# Patient Record
Sex: FEMALE | Race: WHITE | NOT HISPANIC OR LATINO | URBAN - METROPOLITAN AREA
[De-identification: names, ages, dates, MRNs, and addresses within clinical notes are randomized per-mention and may not be internally consistent; named-entity substitution may affect disease eponyms.]

---

## 2017-05-15 ENCOUNTER — OUTPATIENT (OUTPATIENT)
Dept: OUTPATIENT SERVICES | Facility: HOSPITAL | Age: 37
LOS: 1 days | Discharge: HOME | End: 2017-05-15

## 2017-06-28 DIAGNOSIS — Z00.00 ENCOUNTER FOR GENERAL ADULT MEDICAL EXAMINATION WITHOUT ABNORMAL FINDINGS: ICD-10-CM

## 2017-07-17 ENCOUNTER — OUTPATIENT (OUTPATIENT)
Dept: OUTPATIENT SERVICES | Facility: HOSPITAL | Age: 37
LOS: 1 days | Discharge: HOME | End: 2017-07-17

## 2017-07-17 DIAGNOSIS — N91.2 AMENORRHEA, UNSPECIFIED: ICD-10-CM

## 2017-07-25 ENCOUNTER — OUTPATIENT (OUTPATIENT)
Dept: OUTPATIENT SERVICES | Facility: HOSPITAL | Age: 37
LOS: 1 days | Discharge: HOME | End: 2017-07-25

## 2017-07-25 DIAGNOSIS — N91.1 SECONDARY AMENORRHEA: ICD-10-CM

## 2017-08-03 ENCOUNTER — OUTPATIENT (OUTPATIENT)
Dept: OUTPATIENT SERVICES | Facility: HOSPITAL | Age: 37
LOS: 1 days | Discharge: HOME | End: 2017-08-03

## 2017-08-03 DIAGNOSIS — N91.1 SECONDARY AMENORRHEA: ICD-10-CM

## 2017-08-22 ENCOUNTER — OUTPATIENT (OUTPATIENT)
Dept: OUTPATIENT SERVICES | Facility: HOSPITAL | Age: 37
LOS: 1 days | Discharge: HOME | End: 2017-08-22

## 2017-08-22 DIAGNOSIS — Z34.00 ENCOUNTER FOR SUPERVISION OF NORMAL FIRST PREGNANCY, UNSPECIFIED TRIMESTER: ICD-10-CM

## 2017-09-18 ENCOUNTER — OUTPATIENT (OUTPATIENT)
Dept: OUTPATIENT SERVICES | Facility: HOSPITAL | Age: 37
LOS: 1 days | Discharge: HOME | End: 2017-09-18

## 2017-09-18 DIAGNOSIS — Z02.9 ENCOUNTER FOR ADMINISTRATIVE EXAMINATIONS, UNSPECIFIED: ICD-10-CM

## 2017-11-30 ENCOUNTER — OUTPATIENT (OUTPATIENT)
Dept: OUTPATIENT SERVICES | Facility: HOSPITAL | Age: 37
LOS: 1 days | Discharge: HOME | End: 2017-11-30

## 2017-11-30 DIAGNOSIS — Z33.1 PREGNANT STATE, INCIDENTAL: ICD-10-CM

## 2017-12-02 ENCOUNTER — OUTPATIENT (OUTPATIENT)
Dept: OUTPATIENT SERVICES | Facility: HOSPITAL | Age: 37
LOS: 1 days | Discharge: HOME | End: 2017-12-02

## 2017-12-05 ENCOUNTER — OUTPATIENT (OUTPATIENT)
Dept: OUTPATIENT SERVICES | Facility: HOSPITAL | Age: 37
LOS: 1 days | Discharge: HOME | End: 2017-12-05

## 2017-12-05 DIAGNOSIS — Z34.00 ENCOUNTER FOR SUPERVISION OF NORMAL FIRST PREGNANCY, UNSPECIFIED TRIMESTER: ICD-10-CM

## 2018-01-04 PROBLEM — Z00.00 ENCOUNTER FOR PREVENTIVE HEALTH EXAMINATION: Status: ACTIVE | Noted: 2018-01-04

## 2018-01-05 ENCOUNTER — OUTPATIENT (OUTPATIENT)
Dept: OUTPATIENT SERVICES | Facility: HOSPITAL | Age: 38
LOS: 1 days | Discharge: HOME | End: 2018-01-05

## 2018-01-09 DIAGNOSIS — O02.1 MISSED ABORTION: ICD-10-CM

## 2018-01-09 DIAGNOSIS — N85.4 MALPOSITION OF UTERUS: ICD-10-CM

## 2018-01-09 DIAGNOSIS — Z3A.08 8 WEEKS GESTATION OF PREGNANCY: ICD-10-CM

## 2018-01-25 ENCOUNTER — APPOINTMENT (OUTPATIENT)
Dept: OBGYN | Facility: CLINIC | Age: 38
End: 2018-01-25

## 2018-03-19 ENCOUNTER — OUTPATIENT (OUTPATIENT)
Dept: OUTPATIENT SERVICES | Facility: HOSPITAL | Age: 38
LOS: 1 days | Discharge: HOME | End: 2018-03-19

## 2018-03-19 DIAGNOSIS — N92.5 OTHER SPECIFIED IRREGULAR MENSTRUATION: ICD-10-CM

## 2018-04-11 ENCOUNTER — RESULT REVIEW (OUTPATIENT)
Age: 38
End: 2018-04-11

## 2018-04-11 ENCOUNTER — OUTPATIENT (OUTPATIENT)
Dept: OUTPATIENT SERVICES | Facility: HOSPITAL | Age: 38
LOS: 1 days | Discharge: HOME | End: 2018-04-11

## 2018-04-11 VITALS
RESPIRATION RATE: 21 BRPM | HEART RATE: 57 BPM | SYSTOLIC BLOOD PRESSURE: 103 MMHG | DIASTOLIC BLOOD PRESSURE: 58 MMHG | OXYGEN SATURATION: 100 %

## 2018-04-11 VITALS
OXYGEN SATURATION: 97 % | TEMPERATURE: 99 F | HEART RATE: 64 BPM | SYSTOLIC BLOOD PRESSURE: 101 MMHG | WEIGHT: 195.11 LBS | RESPIRATION RATE: 16 BRPM | DIASTOLIC BLOOD PRESSURE: 56 MMHG

## 2018-04-11 DIAGNOSIS — O02.1 MISSED ABORTION: ICD-10-CM

## 2018-04-11 DIAGNOSIS — Z98.890 OTHER SPECIFIED POSTPROCEDURAL STATES: Chronic | ICD-10-CM

## 2018-04-11 DIAGNOSIS — Z3A.08 8 WEEKS GESTATION OF PREGNANCY: ICD-10-CM

## 2018-04-11 RX ORDER — OXYCODONE AND ACETAMINOPHEN 5; 325 MG/1; MG/1
2 TABLET ORAL EVERY 6 HOURS
Qty: 0 | Refills: 0 | Status: DISCONTINUED | OUTPATIENT
Start: 2018-04-11 | End: 2018-04-11

## 2018-04-11 RX ORDER — MORPHINE SULFATE 50 MG/1
2 CAPSULE, EXTENDED RELEASE ORAL
Qty: 0 | Refills: 0 | Status: DISCONTINUED | OUTPATIENT
Start: 2018-04-11 | End: 2018-04-11

## 2018-04-11 RX ORDER — ONDANSETRON 8 MG/1
4 TABLET, FILM COATED ORAL ONCE
Qty: 0 | Refills: 0 | Status: DISCONTINUED | OUTPATIENT
Start: 2018-04-11 | End: 2018-04-26

## 2018-04-11 RX ORDER — MORPHINE SULFATE 50 MG/1
4 CAPSULE, EXTENDED RELEASE ORAL
Qty: 0 | Refills: 0 | Status: DISCONTINUED | OUTPATIENT
Start: 2018-04-11 | End: 2018-04-11

## 2018-04-11 RX ORDER — SODIUM CHLORIDE 9 MG/ML
1000 INJECTION, SOLUTION INTRAVENOUS
Qty: 0 | Refills: 0 | Status: DISCONTINUED | OUTPATIENT
Start: 2018-04-11 | End: 2018-04-26

## 2018-04-11 RX ADMIN — SODIUM CHLORIDE 100 MILLILITER(S): 9 INJECTION, SOLUTION INTRAVENOUS at 13:03

## 2018-04-11 NOTE — ASU PREOP CHECKLIST - PATIENT PROBLEMS/NEEDS
Patient expressed no known problems or needs pt is a positive trh/Patient expressed no known problems or needs

## 2018-04-11 NOTE — BRIEF OPERATIVE NOTE - PROCEDURE
<<-----Click on this checkbox to enter Procedure Dilation and curettage  04/11/2018    Active  [id number]

## 2018-04-11 NOTE — H&P PST ADULT - HISTORY OF PRESENT ILLNESS
38 you G 5 P 0040 LMP: 2/12/18 with blighted ovum/ missed ab on sonogram 4/4/18 - pt should have been 8 wks gestation - sono - no fhr, no pole, + gest sac

## 2018-04-12 LAB — SURGICAL PATHOLOGY STUDY: SIGNIFICANT CHANGE UP

## 2018-05-10 LAB — CHROM ANALY OVERALL INTERP SPEC-IMP: SIGNIFICANT CHANGE UP

## 2018-06-01 ENCOUNTER — OUTPATIENT (OUTPATIENT)
Dept: OUTPATIENT SERVICES | Facility: HOSPITAL | Age: 38
LOS: 1 days | Discharge: HOME | End: 2018-06-01

## 2018-06-01 DIAGNOSIS — Z98.890 OTHER SPECIFIED POSTPROCEDURAL STATES: Chronic | ICD-10-CM

## 2018-06-01 DIAGNOSIS — N96 RECURRENT PREGNANCY LOSS: ICD-10-CM

## 2018-07-25 ENCOUNTER — OUTPATIENT (OUTPATIENT)
Dept: OUTPATIENT SERVICES | Facility: HOSPITAL | Age: 38
LOS: 1 days | Discharge: HOME | End: 2018-07-25

## 2018-07-25 DIAGNOSIS — D47.1 CHRONIC MYELOPROLIFERATIVE DISEASE: ICD-10-CM

## 2018-07-25 DIAGNOSIS — Z98.890 OTHER SPECIFIED POSTPROCEDURAL STATES: Chronic | ICD-10-CM

## 2018-07-25 DIAGNOSIS — D51.1 VITAMIN B12 DEFICIENCY ANEMIA DUE TO SELECTIVE VITAMIN B12 MALABSORPTION WITH PROTEINURIA: ICD-10-CM

## 2018-07-25 DIAGNOSIS — D59.9 ACQUIRED HEMOLYTIC ANEMIA, UNSPECIFIED: ICD-10-CM

## 2018-07-25 DIAGNOSIS — M06.9 RHEUMATOID ARTHRITIS, UNSPECIFIED: ICD-10-CM

## 2018-08-22 ENCOUNTER — OUTPATIENT (OUTPATIENT)
Dept: OUTPATIENT SERVICES | Facility: HOSPITAL | Age: 38
LOS: 1 days | Discharge: HOME | End: 2018-08-22

## 2018-08-22 DIAGNOSIS — Z91.89 OTHER SPECIFIED PERSONAL RISK FACTORS, NOT ELSEWHERE CLASSIFIED: ICD-10-CM

## 2018-08-22 DIAGNOSIS — Z98.890 OTHER SPECIFIED POSTPROCEDURAL STATES: Chronic | ICD-10-CM

## 2018-08-22 DIAGNOSIS — D68.9 COAGULATION DEFECT, UNSPECIFIED: ICD-10-CM

## 2018-08-22 DIAGNOSIS — M06.9 RHEUMATOID ARTHRITIS, UNSPECIFIED: ICD-10-CM

## 2018-09-12 ENCOUNTER — TRANSCRIPTION ENCOUNTER (OUTPATIENT)
Age: 38
End: 2018-09-12

## 2018-10-06 ENCOUNTER — TRANSCRIPTION ENCOUNTER (OUTPATIENT)
Age: 38
End: 2018-10-06

## 2019-04-11 ENCOUNTER — OUTPATIENT (OUTPATIENT)
Dept: OUTPATIENT SERVICES | Facility: HOSPITAL | Age: 39
LOS: 1 days | Discharge: HOME | End: 2019-04-11

## 2019-04-11 DIAGNOSIS — Z34.90 ENCOUNTER FOR SUPERVISION OF NORMAL PREGNANCY, UNSPECIFIED, UNSPECIFIED TRIMESTER: ICD-10-CM

## 2019-04-11 DIAGNOSIS — Z98.890 OTHER SPECIFIED POSTPROCEDURAL STATES: Chronic | ICD-10-CM

## 2019-04-13 ENCOUNTER — OUTPATIENT (OUTPATIENT)
Dept: OUTPATIENT SERVICES | Facility: HOSPITAL | Age: 39
LOS: 1 days | Discharge: HOME | End: 2019-04-13

## 2019-04-13 DIAGNOSIS — Z98.890 OTHER SPECIFIED POSTPROCEDURAL STATES: Chronic | ICD-10-CM

## 2019-04-13 DIAGNOSIS — Z34.90 ENCOUNTER FOR SUPERVISION OF NORMAL PREGNANCY, UNSPECIFIED, UNSPECIFIED TRIMESTER: ICD-10-CM

## 2019-04-17 ENCOUNTER — OUTPATIENT (OUTPATIENT)
Dept: OUTPATIENT SERVICES | Facility: HOSPITAL | Age: 39
LOS: 1 days | Discharge: HOME | End: 2019-04-17

## 2019-04-17 DIAGNOSIS — N92.5 OTHER SPECIFIED IRREGULAR MENSTRUATION: ICD-10-CM

## 2019-04-17 DIAGNOSIS — Z98.890 OTHER SPECIFIED POSTPROCEDURAL STATES: Chronic | ICD-10-CM

## 2019-04-18 ENCOUNTER — OUTPATIENT (OUTPATIENT)
Dept: OUTPATIENT SERVICES | Facility: HOSPITAL | Age: 39
LOS: 1 days | Discharge: HOME | End: 2019-04-18

## 2019-04-18 DIAGNOSIS — Z98.890 OTHER SPECIFIED POSTPROCEDURAL STATES: Chronic | ICD-10-CM

## 2019-04-18 DIAGNOSIS — N92.5 OTHER SPECIFIED IRREGULAR MENSTRUATION: ICD-10-CM

## 2019-04-26 ENCOUNTER — OUTPATIENT (OUTPATIENT)
Dept: OUTPATIENT SERVICES | Facility: HOSPITAL | Age: 39
LOS: 1 days | Discharge: HOME | End: 2019-04-26

## 2019-04-26 DIAGNOSIS — Z98.890 OTHER SPECIFIED POSTPROCEDURAL STATES: Chronic | ICD-10-CM

## 2019-04-26 DIAGNOSIS — Z34.00 ENCOUNTER FOR SUPERVISION OF NORMAL FIRST PREGNANCY, UNSPECIFIED TRIMESTER: ICD-10-CM

## 2019-05-16 ENCOUNTER — OUTPATIENT (OUTPATIENT)
Dept: OUTPATIENT SERVICES | Facility: HOSPITAL | Age: 39
LOS: 1 days | Discharge: HOME | End: 2019-05-16

## 2019-05-16 DIAGNOSIS — Z98.890 OTHER SPECIFIED POSTPROCEDURAL STATES: Chronic | ICD-10-CM

## 2019-05-16 DIAGNOSIS — Z34.00 ENCOUNTER FOR SUPERVISION OF NORMAL FIRST PREGNANCY, UNSPECIFIED TRIMESTER: ICD-10-CM

## 2019-09-04 ENCOUNTER — OUTPATIENT (OUTPATIENT)
Dept: OUTPATIENT SERVICES | Facility: HOSPITAL | Age: 39
LOS: 1 days | Discharge: HOME | End: 2019-09-04

## 2019-09-04 DIAGNOSIS — Z34.00 ENCOUNTER FOR SUPERVISION OF NORMAL FIRST PREGNANCY, UNSPECIFIED TRIMESTER: ICD-10-CM

## 2019-09-04 DIAGNOSIS — Z98.890 OTHER SPECIFIED POSTPROCEDURAL STATES: Chronic | ICD-10-CM

## 2019-10-11 ENCOUNTER — OUTPATIENT (OUTPATIENT)
Dept: OUTPATIENT SERVICES | Facility: HOSPITAL | Age: 39
LOS: 1 days | Discharge: HOME | End: 2019-10-11

## 2019-10-11 DIAGNOSIS — Z34.00 ENCOUNTER FOR SUPERVISION OF NORMAL FIRST PREGNANCY, UNSPECIFIED TRIMESTER: ICD-10-CM

## 2019-10-11 DIAGNOSIS — Z98.890 OTHER SPECIFIED POSTPROCEDURAL STATES: Chronic | ICD-10-CM

## 2019-11-19 ENCOUNTER — OUTPATIENT (OUTPATIENT)
Dept: OUTPATIENT SERVICES | Facility: HOSPITAL | Age: 39
LOS: 1 days | Discharge: HOME | End: 2019-11-19

## 2019-11-19 DIAGNOSIS — Z98.890 OTHER SPECIFIED POSTPROCEDURAL STATES: Chronic | ICD-10-CM

## 2019-11-20 DIAGNOSIS — N89.8 OTHER SPECIFIED NONINFLAMMATORY DISORDERS OF VAGINA: ICD-10-CM

## 2019-12-12 ENCOUNTER — INPATIENT (INPATIENT)
Facility: HOSPITAL | Age: 39
LOS: 4 days | Discharge: HOME | End: 2019-12-17
Attending: OBSTETRICS & GYNECOLOGY | Admitting: OBSTETRICS & GYNECOLOGY

## 2019-12-12 VITALS — DIASTOLIC BLOOD PRESSURE: 72 MMHG | SYSTOLIC BLOOD PRESSURE: 131 MMHG | HEART RATE: 109 BPM

## 2019-12-12 DIAGNOSIS — Z98.890 OTHER SPECIFIED POSTPROCEDURAL STATES: Chronic | ICD-10-CM

## 2019-12-12 LAB
APPEARANCE UR: CLEAR — SIGNIFICANT CHANGE UP
APTT BLD: 22.9 SEC — CRITICAL LOW (ref 27–39.2)
BASOPHILS # BLD AUTO: 0.04 K/UL — SIGNIFICANT CHANGE UP (ref 0–0.2)
BASOPHILS NFR BLD AUTO: 0.3 % — SIGNIFICANT CHANGE UP (ref 0–1)
BILIRUB UR-MCNC: NEGATIVE — SIGNIFICANT CHANGE UP
BLD GP AB SCN SERPL QL: SIGNIFICANT CHANGE UP
COLOR SPEC: SIGNIFICANT CHANGE UP
DIFF PNL FLD: NEGATIVE — SIGNIFICANT CHANGE UP
EOSINOPHIL # BLD AUTO: 0.09 K/UL — SIGNIFICANT CHANGE UP (ref 0–0.7)
EOSINOPHIL NFR BLD AUTO: 0.7 % — SIGNIFICANT CHANGE UP (ref 0–8)
FIBRINOGEN PPP-MCNC: >700 MG/DL — HIGH (ref 204.4–570.6)
GLUCOSE UR QL: NEGATIVE — SIGNIFICANT CHANGE UP
HCT VFR BLD CALC: 35.9 % — LOW (ref 37–47)
HGB BLD-MCNC: 12.2 G/DL — SIGNIFICANT CHANGE UP (ref 12–16)
IMM GRANULOCYTES NFR BLD AUTO: 0.7 % — HIGH (ref 0.1–0.3)
INR BLD: 0.88 RATIO — SIGNIFICANT CHANGE UP (ref 0.65–1.3)
KETONES UR-MCNC: NEGATIVE — SIGNIFICANT CHANGE UP
LEUKOCYTE ESTERASE UR-ACNC: NEGATIVE — SIGNIFICANT CHANGE UP
LYMPHOCYTES # BLD AUTO: 1.97 K/UL — SIGNIFICANT CHANGE UP (ref 1.2–3.4)
LYMPHOCYTES # BLD AUTO: 14.9 % — LOW (ref 20.5–51.1)
MCHC RBC-ENTMCNC: 33.2 PG — HIGH (ref 27–31)
MCHC RBC-ENTMCNC: 34 G/DL — SIGNIFICANT CHANGE UP (ref 32–37)
MCV RBC AUTO: 97.8 FL — SIGNIFICANT CHANGE UP (ref 81–99)
MONOCYTES # BLD AUTO: 1 K/UL — HIGH (ref 0.1–0.6)
MONOCYTES NFR BLD AUTO: 7.5 % — SIGNIFICANT CHANGE UP (ref 1.7–9.3)
NEUTROPHILS # BLD AUTO: 10.07 K/UL — HIGH (ref 1.4–6.5)
NEUTROPHILS NFR BLD AUTO: 75.9 % — HIGH (ref 42.2–75.2)
NITRITE UR-MCNC: NEGATIVE — SIGNIFICANT CHANGE UP
NRBC # BLD: 0 /100 WBCS — SIGNIFICANT CHANGE UP (ref 0–0)
PH UR: 6.5 — SIGNIFICANT CHANGE UP (ref 5–8)
PLATELET # BLD AUTO: 214 K/UL — SIGNIFICANT CHANGE UP (ref 130–400)
PRENATAL SYPHILIS TEST: SIGNIFICANT CHANGE UP
PROT UR-MCNC: NEGATIVE — SIGNIFICANT CHANGE UP
PROTHROM AB SERPL-ACNC: 10.1 SEC — SIGNIFICANT CHANGE UP (ref 9.95–12.87)
RBC # BLD: 3.67 M/UL — LOW (ref 4.2–5.4)
RBC # FLD: 12.9 % — SIGNIFICANT CHANGE UP (ref 11.5–14.5)
SP GR SPEC: 1.01 — SIGNIFICANT CHANGE UP (ref 1.01–1.02)
UROBILINOGEN FLD QL: SIGNIFICANT CHANGE UP
WBC # BLD: 13.26 K/UL — HIGH (ref 4.8–10.8)
WBC # FLD AUTO: 13.26 K/UL — HIGH (ref 4.8–10.8)

## 2019-12-12 RX ORDER — OXYTOCIN 10 UNIT/ML
333.33 VIAL (ML) INJECTION
Qty: 20 | Refills: 0 | Status: DISCONTINUED | OUTPATIENT
Start: 2019-12-12 | End: 2019-12-17

## 2019-12-12 RX ORDER — OXYTOCIN 10 UNIT/ML
2 VIAL (ML) INJECTION
Qty: 30 | Refills: 0 | Status: DISCONTINUED | OUTPATIENT
Start: 2019-12-12 | End: 2019-12-14

## 2019-12-12 RX ORDER — SODIUM CHLORIDE 9 MG/ML
1000 INJECTION, SOLUTION INTRAVENOUS
Refills: 0 | Status: DISCONTINUED | OUTPATIENT
Start: 2019-12-12 | End: 2019-12-14

## 2019-12-12 RX ADMIN — Medication 2 MILLIUNIT(S)/MIN: at 22:43

## 2019-12-12 NOTE — OB PROVIDER H&P - NSHPPHYSICALEXAM_GEN_ALL_CORE
Vital Signs Last 24 Hrs  T(F): 99.1 (12 Dec 2019 21:37), Max: 99.14 (12 Dec 2019 21:30)  HR: 109 (12 Dec 2019 21:37) (109 - 109)  BP: 131/72 (12 Dec 2019 21:37) (131/72 - 131/72)  RR: 16 (12 Dec 2019 21:37) (16 - 16)    Gen: AOx3, no acute distress  Abd: soft, gravid, non tender, no palpable contractions  Ext: No edema bilaterally    EFM:  150/mod/+accels; cat 1  [place]: q3min  SVE: 2/50/-3 vtx intact per PMD    BSS: cephalic

## 2019-12-12 NOTE — OB PROVIDER H&P - NSHPLABSRESULTS_GEN_ALL_CORE
11/9/2019 Fetal echo: normal    SONOS  34w4d: cephalic, ant placenta, MVP 52mm, EFW 2587g (53%), BPP 8/8  32w4d: cephalic, ant placenta, MVP 49mm, EFW 2102 g (53%), BPP 8/8  30w5dL cephalic, ant placenta, MVP 65mm, EFW 1617g (49%), BPP 8/8   quad screen normal  NIPS no aneuploidy detected    5/6/19  measles immune  HgA1C: 5.1%  TSH 2.09  varicella immune    11/9/2019 Fetal echo: normal    SONOS  34w4d: cephalic, ant placenta, MVP 52mm, EFW 2587g (53%), BPP 8/8  32w4d: cephalic, ant placenta, MVP 49mm, EFW 2102 g (53%), BPP 8/8  30w5d: cephalic, ant placenta, MVP 65mm, EFW 1617g (49%), BPP 8/8  28w5d: cephalic, ant placenta, ant placenta, MVP 46mm, EFW 1249g (50%), BPP 8/8  24w5d: breech, ant placenta, EFW 754g (51%)  20w4d: transverse, ant placenta, CL 3.7cm, EFW 380g, fetal anatomy normal  16w4d: transverse, ant placenta, EFW 182g  11w6d: SIUP, CRL 46.8mm c/w LMP, normal ovaries bilaterally CL 34mm, +FH

## 2019-12-12 NOTE — OB PROVIDER H&P - ASSESSMENT
A/P: 38yo  at 39w0d, GBS neg, AMA, h/o recurrent pregnancy loss on anticoagulation during pregnancy, IOL for favorable cervix at term.    - admit to L&D  - f/u admission labs, coags  - pitocin for IOL  - cont EFM and toco  - pain management PRN  - IV hydration, clear liquid diet  - monitor vitals    Dr. Whiting and Dr. Yarbrough aware. A/P: 40yo  at 39w0d, GBS neg, AMA, h/o recurrent pregnancy loss on anticoagulation during pregnancy, IOL for favorable cervix at term.  - admit to L&D  - f/u admission labs, coags  - pitocin for IOL  - cont EFM and toco  - pain management PRN  - IV hydration, clear liquid diet  - monitor vitals    Dr. Whiting and Dr. Yarbrough aware.

## 2019-12-12 NOTE — OB PROVIDER H&P - HISTORY OF PRESENT ILLNESS
40yo  at 39w0d EDC 19 by LMP c/w first trimester sono, JOSEFINAA, here for scheduled IOl for favorable cervix at term. Denies any contractions, LOF, or vaginal bleeding. Good fetal movement. Patient has a history of recurrent pregnancy loss, and is heterozygous for MTFHR. Was put on lovenox at first prenatal visit at 9w, switched to heparin 5000u BID at 36weeks, last dose was  PM. Per PMD, last exam was 250/-3 on . GBS neg. 40yo  at 39w0d EDC 19 by LMP c/w first trimester sono, JOSEFINAA, here for scheduled IOl for favorable cervix at term. Denies any contractions, LOF, or vaginal bleeding. Good fetal movement. Patient has a history of recurrent pregnancy loss, and is heterozygous for MTHFR. Was put on lovenox at first prenatal visit at 9w, switched to heparin 5000u BID at 36weeks, last dose was 1211 PM. Per PMD, last exam was 50/-3 on . GBS neg.

## 2019-12-12 NOTE — OB PROVIDER H&P - ATTENDING COMMENTS
IMP: IUP @ 39wk, Poor OB History, Favorable Cervix at Term    Plan: Admit, Pitocin Induction, Pain Management, Anticipated

## 2019-12-12 NOTE — OB PROVIDER H&P - NS_OBGYNHISTORY_OBGYN_ALL_OB_FT
ObHx: SABx3 with D&C x2  D&E at 15w for triploidy  eTOPx1 with D&C    GynHx: Denies hx of fibroids, cysts, abnormal paps, or STDs.

## 2019-12-13 LAB
AMPHET UR-MCNC: NEGATIVE — SIGNIFICANT CHANGE UP
BARBITURATES UR SCN-MCNC: NEGATIVE — SIGNIFICANT CHANGE UP
BENZODIAZ UR-MCNC: NEGATIVE — SIGNIFICANT CHANGE UP
BUPRENORPHINE SCREEN, URINE RESULT: NEGATIVE — SIGNIFICANT CHANGE UP
COCAINE METAB.OTHER UR-MCNC: NEGATIVE — SIGNIFICANT CHANGE UP
FENTANYL UR QL: NEGATIVE — SIGNIFICANT CHANGE UP
L&D DRUG SCREEN, URINE: SIGNIFICANT CHANGE UP
METHADONE UR-MCNC: NEGATIVE — SIGNIFICANT CHANGE UP
OPIATES UR-MCNC: NEGATIVE — SIGNIFICANT CHANGE UP
OXYCODONE UR-MCNC: NEGATIVE — SIGNIFICANT CHANGE UP
PCP UR-MCNC: NEGATIVE — SIGNIFICANT CHANGE UP
PROPOXYPHENE QUALITATIVE URINE RESULT: NEGATIVE — SIGNIFICANT CHANGE UP

## 2019-12-13 RX ORDER — ONDANSETRON 8 MG/1
4 TABLET, FILM COATED ORAL EVERY 6 HOURS
Refills: 0 | Status: DISCONTINUED | OUTPATIENT
Start: 2019-12-13 | End: 2019-12-17

## 2019-12-13 RX ORDER — CITRIC ACID/SODIUM CITRATE 300-500 MG
30 SOLUTION, ORAL ORAL ONCE
Refills: 0 | Status: COMPLETED | OUTPATIENT
Start: 2019-12-13 | End: 2019-12-13

## 2019-12-13 RX ORDER — OXYTOCIN 10 UNIT/ML
2 VIAL (ML) INJECTION
Qty: 30 | Refills: 0 | Status: DISCONTINUED | OUTPATIENT
Start: 2019-12-13 | End: 2019-12-17

## 2019-12-13 RX ORDER — FENTANYL/BUPIVACAINE/NS/PF 2MCG/ML-.1
250 PLASTIC BAG, INJECTION (ML) INJECTION
Refills: 0 | Status: DISCONTINUED | OUTPATIENT
Start: 2019-12-13 | End: 2019-12-17

## 2019-12-13 RX ORDER — NALOXONE HYDROCHLORIDE 4 MG/.1ML
0.1 SPRAY NASAL
Refills: 0 | Status: DISCONTINUED | OUTPATIENT
Start: 2019-12-13 | End: 2019-12-17

## 2019-12-13 RX ORDER — DEXAMETHASONE 0.5 MG/5ML
4 ELIXIR ORAL EVERY 6 HOURS
Refills: 0 | Status: DISCONTINUED | OUTPATIENT
Start: 2019-12-13 | End: 2019-12-17

## 2019-12-13 RX ADMIN — Medication 30 MILLILITER(S): at 00:21

## 2019-12-13 NOTE — PROGRESS NOTE ADULT - SUBJECTIVE AND OBJECTIVE BOX
PGY1 Note    Patient seen at bedside for evaluation of labor progression, doing well, no complaints.     T(F): 98.96 (19 @ 16:24), Max: 98.96 (19 @ 12:27)  HR: 85 (19 @ 21:10) (64 - 108)  BP: 118/69 (19 @ 21:10) (77/40 - 178/86)  SpO2: 100% (19 @ 10:27) (91% - 100%)    EFM: 150/mod/+accels  TOCO: q2-4 mins  SVE: 5/70/-2 @2345 per Dr. Yarbrough's exam    Medications:  (Floorstock): 1 Each (19 @ 15:33)  (Floorstock): 1 Each (19 @ 20:15)  (Floorstock): 1 Each (19 @ 15:33)  (Floorstock): 1 Each (19 @ 23:48)  citric acid/sodium citrate Solution: 30 milliLiter(s) (19 @ 00:21)  oxytocin Infusion: 2 mL/Hr (19 @ 22:22)      Labs:                        12.2   13.26 )-----------( 214      ( 12 Dec 2019 21:45 )             35.9               Urinalysis Basic - ( 12 Dec 2019 21:45 )    Color: Light Yellow / Appearance: Clear / S.015 / pH: x  Gluc: x / Ketone: Negative  / Bili: Negative / Urobili: <2 mg/dL   Blood: x / Protein: Negative / Nitrite: Negative   Leuk Esterase: Negative / RBC: x / WBC x   Sq Epi: x / Non Sq Epi: x / Bacteria: x

## 2019-12-13 NOTE — PROGRESS NOTE ADULT - SUBJECTIVE AND OBJECTIVE BOX
PT evaluated at the bedside epidural in place  ICU Vital Signs Last 24 Hrs  T(C): 37.1 (13 Dec 2019 07:11), Max: 37.3 (12 Dec 2019 21:30)  T(F): 98.78 (13 Dec 2019 07:11), Max: 99.14 (12 Dec 2019 21:30)  HR: 83 (13 Dec 2019 08:57) (68 - 109)  BP: 103/55 (13 Dec 2019 08:49) (77/40 - 178/86)  RR: 16 (12 Dec 2019 21:37) (16 - 16)  SpO2: 98% (13 Dec 2019 08:57) (93% - 100%)        TOCO Q 4  VE 4/70/-2  AROM attempted no fluid obtained    A/P 40 y/o  @ 39.1 weeks, favorable cervix at term, with MTHFR heterozygous on lovenox now heparin 5000BID for pitocin IOL  - continue efm & toco monitoring  - iv hydration  - Dr Yarbrough aware  - increase pitocin as ordered

## 2019-12-13 NOTE — PROGRESS NOTE ADULT - SUBJECTIVE AND OBJECTIVE BOX
PGY1 Note    Patient seen at bedside for evaluation of labor progression, doing well, no complaints.     T(F): 98.96 (19 @ 16:24), Max: 99.14 (19 @ 21:30)  HR: 80 (19 @ 19:54) (64 - 109)  BP: 110/58 (19 @ 19:54) (77/40 - 178/86)  RR: 16 (19 @ 21:37) (16 - 16)  SpO2: 100% (19 @ 10:27) (91% - 100%)    EFM: 140/mod/+accels; cat 1  TOCO: q2-4min  SVE: deferred, /-2 AROM clear @1611    Medications:  pitocin restarted @1540, currently at 14mu/min  epidural placed @0320    Labs:                        12.2   13.26 )-----------( 214      ( 12 Dec 2019 21:45 )             35.9           ABO RH Interpretation: A POS (19 @ 21:45)    Antibody Screen: NEG (19 @ 21:45)    Urinalysis Basic - ( 12 Dec 2019 21:45 )    Color: Light Yellow / Appearance: Clear / S.015 / pH: x  Gluc: x / Ketone: Negative  / Bili: Negative / Urobili: <2 mg/dL   Blood: x / Protein: Negative / Nitrite: Negative   Leuk Esterase: Negative / RBC: x / WBC x   Sq Epi: x / Non Sq Epi: x / Bacteria: x      L&amp;D Drug Screen, Urine: Done (19 @ 21:45)    Prenatal Syphilis Test: Nonreact (19 @ 21:45)

## 2019-12-13 NOTE — PROGRESS NOTE ADULT - SUBJECTIVE AND OBJECTIVE BOX
PGY 4 note    Pt contraction pattern spacing out on 18mu of pitocin.  Tracing occasionally cat II, mostly cat I, responds to resuscitative measure.  Plan to d/c pitocin and restart after holiday.    Dr Yarbrough aware

## 2019-12-13 NOTE — PROGRESS NOTE ADULT - SUBJECTIVE AND OBJECTIVE BOX
Patient resting comfortably with irregular contractions epidural in place    ICU Vital Signs Last 24 Hrs  T(C): 37.2 (13 Dec 2019 12:27), Max: 37.3 (12 Dec 2019 21:30)  T(F): 98.96 (13 Dec 2019 12:27), Max: 99.14 (12 Dec 2019 21:30)  HR: 89 (13 Dec 2019 15:47) (64 - 109)  BP: 115/58 (13 Dec 2019 15:47) (77/40 - 178/86)  RR: 16 (12 Dec 2019 21:37) (16 - 16)  SpO2: 100% (13 Dec 2019 10:27) (91% - 100%)      TOCO irregular  VE deferred    A/P 40 y/o  @ 39.1 weeks favorable cervix at term s/p pitocin IOL .  will restart pitocin after rest period  - continue efm & toco monitoring  - IV hydration  - analgesia prn  - Dr Yarbrough/Dr Lawson aware

## 2019-12-13 NOTE — PROGRESS NOTE ADULT - ASSESSMENT
A/P: 38yo  at 39w0d, GBS neg, AMA, h/o recurrent pregnancy loss on anticoagulation during pregnancy, IOL for favorable cervix at term, AROM, s/p epi, currently on pitocin   -continue pitocin  -epi for pain management  -cont efm/toco  -cont to monitor vitals  -cont iv hydration    Dr. Whiting and Dr. Yarbrough aware.

## 2019-12-13 NOTE — PROCEDURE NOTE - ADDITIONAL PROCEDURE DETAILS
infusion of 0.1% bupiv+fent 2mcg/ml at 15ml/hr infusion of 0.1% bupiv+fent 2mcg/ml at 15ml/hr  20.00pm New Fentanyl/Bupivacaine bag replaced infusion of 0.1% bupiv+fent 2mcg/ml at 15ml/hr  20.00pm New Fentanyl/Bupivacaine bag replaced  23.50pm Top off given to pt. 100mcg fentanyl with 5cc .5% bupivacaine. infusion of 0.1% bupiv+fent 2mcg/ml at 15ml/hr  20.00pm New Fentanyl/Bupivacaine bag replaced  23.50pm Top off given to pt. 100mcg fentanyl with 5cc .5% bupivacaine.  01.36am Patient taken to OR for  for arrest of descent.

## 2019-12-13 NOTE — PROGRESS NOTE ADULT - ASSESSMENT
A/P: 40yo  at 39w0d, GBS neg, AMA, h/o recurrent pregnancy loss on anticoagulation during pregnancy, IOL for favorable cervix at term, AROM, s/p epi, currently on pitocin   -continue pitocin  -epi for pain management  -cont efm/toco  -cont to monitor vitals  -cont iv hydration    Dr. Whiting and Dr. Yarbrough aware.

## 2019-12-14 RX ORDER — OXYCODONE HYDROCHLORIDE 5 MG/1
5 TABLET ORAL ONCE
Refills: 0 | Status: DISCONTINUED | OUTPATIENT
Start: 2019-12-14 | End: 2019-12-17

## 2019-12-14 RX ORDER — OXYCODONE HYDROCHLORIDE 5 MG/1
5 TABLET ORAL
Refills: 0 | Status: DISCONTINUED | OUTPATIENT
Start: 2019-12-14 | End: 2019-12-17

## 2019-12-14 RX ORDER — METOCLOPRAMIDE HCL 10 MG
10 TABLET ORAL ONCE
Refills: 0 | Status: DISCONTINUED | OUTPATIENT
Start: 2019-12-14 | End: 2019-12-14

## 2019-12-14 RX ORDER — FAMOTIDINE 10 MG/ML
20 INJECTION INTRAVENOUS ONCE
Refills: 0 | Status: DISCONTINUED | OUTPATIENT
Start: 2019-12-14 | End: 2019-12-14

## 2019-12-14 RX ORDER — SIMETHICONE 80 MG/1
80 TABLET, CHEWABLE ORAL EVERY 4 HOURS
Refills: 0 | Status: DISCONTINUED | OUTPATIENT
Start: 2019-12-14 | End: 2019-12-17

## 2019-12-14 RX ORDER — ENOXAPARIN SODIUM 100 MG/ML
40 INJECTION SUBCUTANEOUS EVERY 24 HOURS
Refills: 0 | Status: DISCONTINUED | OUTPATIENT
Start: 2019-12-14 | End: 2019-12-17

## 2019-12-14 RX ORDER — CITRIC ACID/SODIUM CITRATE 300-500 MG
30 SOLUTION, ORAL ORAL ONCE
Refills: 0 | Status: DISCONTINUED | OUTPATIENT
Start: 2019-12-14 | End: 2019-12-14

## 2019-12-14 RX ORDER — KETOROLAC TROMETHAMINE 30 MG/ML
30 SYRINGE (ML) INJECTION EVERY 6 HOURS
Refills: 0 | Status: COMPLETED | OUTPATIENT
Start: 2019-12-14 | End: 2019-12-15

## 2019-12-14 RX ORDER — LANOLIN
1 OINTMENT (GRAM) TOPICAL EVERY 6 HOURS
Refills: 0 | Status: DISCONTINUED | OUTPATIENT
Start: 2019-12-14 | End: 2019-12-17

## 2019-12-14 RX ORDER — MAGNESIUM HYDROXIDE 400 MG/1
30 TABLET, CHEWABLE ORAL
Refills: 0 | Status: DISCONTINUED | OUTPATIENT
Start: 2019-12-14 | End: 2019-12-17

## 2019-12-14 RX ORDER — CEFAZOLIN SODIUM 1 G
2000 VIAL (EA) INJECTION EVERY 8 HOURS
Refills: 0 | Status: COMPLETED | OUTPATIENT
Start: 2019-12-14 | End: 2019-12-14

## 2019-12-14 RX ORDER — DIPHENHYDRAMINE HCL 50 MG
25 CAPSULE ORAL EVERY 6 HOURS
Refills: 0 | Status: DISCONTINUED | OUTPATIENT
Start: 2019-12-14 | End: 2019-12-17

## 2019-12-14 RX ORDER — IBUPROFEN 200 MG
600 TABLET ORAL EVERY 6 HOURS
Refills: 0 | Status: DISCONTINUED | OUTPATIENT
Start: 2019-12-14 | End: 2019-12-15

## 2019-12-14 RX ORDER — OXYTOCIN 10 UNIT/ML
333.33 VIAL (ML) INJECTION
Qty: 20 | Refills: 0 | Status: DISCONTINUED | OUTPATIENT
Start: 2019-12-14 | End: 2019-12-17

## 2019-12-14 RX ORDER — GLYCERIN ADULT
1 SUPPOSITORY, RECTAL RECTAL AT BEDTIME
Refills: 0 | Status: DISCONTINUED | OUTPATIENT
Start: 2019-12-14 | End: 2019-12-17

## 2019-12-14 RX ORDER — DIPHENHYDRAMINE HCL 50 MG
25 CAPSULE ORAL ONCE
Refills: 0 | Status: COMPLETED | OUTPATIENT
Start: 2019-12-14 | End: 2019-12-14

## 2019-12-14 RX ORDER — CEFAZOLIN SODIUM 1 G
2000 VIAL (EA) INJECTION ONCE
Refills: 0 | Status: DISCONTINUED | OUTPATIENT
Start: 2019-12-14 | End: 2019-12-14

## 2019-12-14 RX ORDER — MORPHINE SULFATE 50 MG/1
0.25 CAPSULE, EXTENDED RELEASE ORAL ONCE
Refills: 0 | Status: DISCONTINUED | OUTPATIENT
Start: 2019-12-14 | End: 2019-12-17

## 2019-12-14 RX ORDER — IBUPROFEN 200 MG
600 TABLET ORAL EVERY 6 HOURS
Refills: 0 | Status: COMPLETED | OUTPATIENT
Start: 2019-12-14 | End: 2020-11-11

## 2019-12-14 RX ORDER — OXYCODONE HYDROCHLORIDE 5 MG/1
10 TABLET ORAL
Refills: 0 | Status: DISCONTINUED | OUTPATIENT
Start: 2019-12-14 | End: 2019-12-17

## 2019-12-14 RX ORDER — SODIUM CHLORIDE 9 MG/ML
1000 INJECTION, SOLUTION INTRAVENOUS
Refills: 0 | Status: DISCONTINUED | OUTPATIENT
Start: 2019-12-14 | End: 2019-12-17

## 2019-12-14 RX ORDER — ACETAMINOPHEN 500 MG
975 TABLET ORAL
Refills: 0 | Status: DISCONTINUED | OUTPATIENT
Start: 2019-12-14 | End: 2019-12-17

## 2019-12-14 RX ADMIN — Medication 100 MILLIGRAM(S): at 09:20

## 2019-12-14 RX ADMIN — Medication 30 MILLIGRAM(S): at 19:41

## 2019-12-14 RX ADMIN — Medication 30 MILLIGRAM(S): at 12:56

## 2019-12-14 RX ADMIN — ENOXAPARIN SODIUM 40 MILLIGRAM(S): 100 INJECTION SUBCUTANEOUS at 16:18

## 2019-12-14 RX ADMIN — Medication 25 MILLIGRAM(S): at 09:19

## 2019-12-14 RX ADMIN — Medication 100 MILLIGRAM(S): at 18:02

## 2019-12-14 RX ADMIN — Medication 1 TABLET(S): at 12:55

## 2019-12-14 RX ADMIN — Medication 975 MILLIGRAM(S): at 20:09

## 2019-12-14 RX ADMIN — Medication 975 MILLIGRAM(S): at 09:29

## 2019-12-14 RX ADMIN — Medication 25 MILLIGRAM(S): at 18:13

## 2019-12-14 RX ADMIN — Medication 975 MILLIGRAM(S): at 15:03

## 2019-12-14 NOTE — BRIEF OPERATIVE NOTE - NSICDXBRIEFPROCEDURE_GEN_ALL_CORE_FT
PROCEDURES:  Primary low transverse  section 14-Dec-2019 03:02:04 Pfannenstiel incision Tasha Whiting

## 2019-12-14 NOTE — PROGRESS NOTE ADULT - SUBJECTIVE AND OBJECTIVE BOX
SUPRIYA HARDY  39y  Female    PGY1 Note:    POD#0  Pt is recovering well, no acute complaints. Pt complains of intermittent itchiness and intermittent nausea but denies any episodes of vomiting Pt denies headache, chest pain, SOB, fever chills, extremity pain or swelling.     Ambulating: No, SCDs in place   Voiding: No, shepherd catheter in place   Flatus: No  Bowel movements: No    Breast or bottle feeding: Both   Diet: CLD     MEDICATIONS  (STANDING):  acetaminophen   Tablet .. 975 milliGRAM(s) Oral <User Schedule>  ceFAZolin   IVPB 2000 milliGRAM(s) IV Intermittent every 8 hours  enoxaparin Injectable 40 milliGRAM(s) SubCutaneous every 24 hours  fentanyl (2 MICROgram(s)/mL) + bupivacaine 0.1%  in Sodium Chloride 0.9% Epidural Drip 250 milliLiter(s) Epidural Drip <Continuous>  ibuprofen  Tablet. 600 milliGRAM(s) Oral every 6 hours  ibuprofen  Tablet. 600 milliGRAM(s) Oral every 6 hours  ketorolac   Injectable 30 milliGRAM(s) IV Push every 6 hours  lactated ringers. 1000 milliLiter(s) (125 mL/Hr) IV Continuous <Continuous>  morphine PF Spinal 0.25 milliGRAM(s) IntraThecal. once  oxytocin Infusion 333.333 milliUNIT(s)/Min (1000 mL/Hr) IV Continuous <Continuous>  oxytocin Infusion 333.333 milliUNIT(s)/Min (1000 mL/Hr) IV Continuous <Continuous>  oxytocin Infusion 2 milliUNIT(s)/Min (2 mL/Hr) IV Continuous <Continuous>  prenatal multivitamin 1 Tablet(s) Oral daily    MEDICATIONS  (PRN):  dexAMETHasone  Injectable 4 milliGRAM(s) IV Push every 6 hours PRN Nausea  diphenhydrAMINE 25 milliGRAM(s) Oral every 6 hours PRN Itching  glycerin Suppository - Adult 1 Suppository(s) Rectal at bedtime PRN Constipation  lanolin Ointment 1 Application(s) Topical every 6 hours PRN Sore Nipples  magnesium hydroxide Suspension 30 milliLiter(s) Oral two times a day PRN Constipation  naloxone Injectable 0.1 milliGRAM(s) IV Push every 3 minutes PRN For ANY of the following changes in patient status:  A. RR LESS THAN 10 breaths per minute, B. Oxygen saturation LESS THAN 90%, C. Sedation score of 6  ondansetron Injectable 4 milliGRAM(s) IV Push every 6 hours PRN Nausea  oxyCODONE    IR 5 milliGRAM(s) Oral every 3 hours PRN Mild Pain (1 - 3)  oxyCODONE    IR 10 milliGRAM(s) Oral every 3 hours PRN Moderate Pain (4 - 6)  oxyCODONE    IR 5 milliGRAM(s) Oral every 3 hours PRN Moderate to Severe Pain (4-10)  oxyCODONE    IR 5 milliGRAM(s) Oral once PRN Moderate to Severe Pain (4-10)  oxyCODONE    IR 5 milliGRAM(s) Oral every 3 hours PRN Moderate to Severe Pain (4-10)  oxyCODONE    IR 5 milliGRAM(s) Oral once PRN Moderate to Severe Pain (4-10)  simethicone 80 milliGRAM(s) Chew every 4 hours PRN Gas    Physical Exam  Vital Signs Last 24 Hrs  T(C): 36.8 (14 Dec 2019 08:50), Max: 37.2 (13 Dec 2019 12:27)  T(F): 98.3 (14 Dec 2019 08:50), Max: 98.96 (13 Dec 2019 12:27)  HR: 80 (14 Dec 2019 08:50) (58 - 108)  BP: 103/56 (14 Dec 2019 08:50) (89/52 - 126/71)  RR: 19 (14 Dec 2019 08:50) (19 - 19)  SpO2: 93% (14 Dec 2019 07:22) (87% - 100%)    Gen: AAOx3, NAD  Heart: RRR  Lungs: CTAB   Fundus: firm, below umbilicus   Wound: JERRY dressing in place, dressing c/d/i   Abd: Soft, nontender, nondistended, BS+  Lochia: minimal rubra   Ext: No calf tenderness, no swelling    Labs:                        12.2   13.26 )-----------( 214      ( 12 Dec 2019 21:45 )             35.9

## 2019-12-14 NOTE — CHART NOTE - NSCHARTNOTEFT_GEN_A_CORE
PGY 1 Note    Patient seen and examined at bedside, VE /-2, unchanged from 1600. Has been on pitocin for 26 hours. Ruptured membranes since 1600. Discussed continuing trial of labor vs primary  section. Risks/benefits/alternative discussed. Patient decided for primary  section for arrest of dilation. Informed consent obtained. Anesthesia and pediatric teams to be made aware. Preop medications ordered, skin prepped.     EFM: cat 1 tracing  [place]: q2-4 mins    Cephalic, anterior placenta, pre CBC                       12.2   13.26 )-----------( 214      ( 12 Dec 2019 21:45 )             35.9     Dr. Whiting and Dr. Yarbrough aware. PGY 1 Note    Patient seen and examined at bedside, VE /-2, unchanged from 1600. Has been on pitocin for 26 hours. Ruptured membranes since 1600. Discussed continuing trial of labor vs primary  section. Risks/benefits/alternative discussed. Patient decided for primary  section for arrest of dilation. Informed consent obtained. Anesthesia and pediatric teams to be made aware. Preop medications ordered, skin prepped.     EFM: cat 1 tracing  [place] II: q2-4 mins    Cephalic, anterior placenta, pre CBC                       12.2   13.26 )-----------( 214      ( 12 Dec 2019 21:45 )             35.9     Dr. Whiting and Dr. Yarbrough aware.    Attending: as above, pt seen at bedside, agree with findings, impression and plan

## 2019-12-14 NOTE — BRIEF OPERATIVE NOTE - OPERATION/FINDINGS
Live female infant, delivered in cephalic presentation, APGARS 9/9, bwt 3300g. Normal appearing uterus, fallopian tubes and ovaries bilaterally.

## 2019-12-14 NOTE — BRIEF OPERATIVE NOTE - NSICDXBRIEFPOSTOP_GEN_ALL_CORE_FT
POST-OP DIAGNOSIS:  Term pregnancy 14-Dec-2019 03:07:17 39w1d Tasha Whiting  Arrested labor 14-Dec-2019 03:06:49 arrest of dilation at 5cm Tasha Whiting

## 2019-12-14 NOTE — PROGRESS NOTE ADULT - ASSESSMENT
A/P: 38yo now  POD#1 S/P PLTC/S for arrest of dilation, recovering well   - encourage PO hydration  - monitor vitals, bleeding   - simethicone/colace   - lovenox ordered   - pain mgmt prn   - f/u AM CBC   - f/u urine output  - if adequate urine output, consider removing shepherd for TOV at around 1800  - encourage incentive spirometry use  -diet: CLD (consider advancement once passes flatus)  -zofran PRN for nausea     Dr. Gutierrez and Dr. Yarbrough to be made aware A/P: 40yo now  POD#1 S/P PLTC/S for arrest of dilation, h/o antiphospholipid syndrome, recovering well   - encourage PO hydration  - monitor vitals, bleeding   - simethicone/colace   - lovenox ordered, need lovenox anticoagulation for 6 weeks for antiphospholipids   - pain mgmt prn   - f/u AM CBC   - f/u urine output  - if adequate urine output, consider removing shepherd for TOV at around 1800  - encourage incentive spirometry use  -diet: CLD (consider advancement once passes flatus)  -zofran PRN for nausea     Dr. Gutierrez and Dr. Yarbrough to be made aware A/P: 40yo now  POD#1 S/P PLTC/S for arrest of dilation, h/o antiphospholipid syndrome, recovering well   - encourage PO hydration  - monitor vitals, bleeding   - simethicone/colace   - lovenox ordered, need lovenox anticoagulation for 6 weeks for antiphospholipids (will need rx upon discharge)  - pain mgmt prn   - f/u AM CBC   - f/u urine output  - if adequate urine output, consider removing shepherd for TOV at around 1800  - encourage incentive spirometry use  -diet: CLD (consider advancement once passes flatus)  -zofran PRN for nausea     Dr. Gutierrez and Dr. Yarbrough to be made aware

## 2019-12-14 NOTE — BRIEF OPERATIVE NOTE - NSICDXBRIEFPREOP_GEN_ALL_CORE_FT
PRE-OP DIAGNOSIS:  Term pregnancy 14-Dec-2019 03:06:27 39w1d Tasha Whiting  Arrested labor 14-Dec-2019 03:06:09 arrest of dilation at 5cm Tasha Whiting

## 2019-12-15 LAB
BASOPHILS # BLD AUTO: 0.05 K/UL — SIGNIFICANT CHANGE UP (ref 0–0.2)
BASOPHILS NFR BLD AUTO: 0.3 % — SIGNIFICANT CHANGE UP (ref 0–1)
EOSINOPHIL # BLD AUTO: 0.09 K/UL — SIGNIFICANT CHANGE UP (ref 0–0.7)
EOSINOPHIL NFR BLD AUTO: 0.5 % — SIGNIFICANT CHANGE UP (ref 0–8)
HCT VFR BLD CALC: 27.2 % — LOW (ref 37–47)
HGB BLD-MCNC: 9 G/DL — LOW (ref 12–16)
IMM GRANULOCYTES NFR BLD AUTO: 0.7 % — HIGH (ref 0.1–0.3)
LYMPHOCYTES # BLD AUTO: 1.92 K/UL — SIGNIFICANT CHANGE UP (ref 1.2–3.4)
LYMPHOCYTES # BLD AUTO: 10.9 % — LOW (ref 20.5–51.1)
MCHC RBC-ENTMCNC: 33.1 G/DL — SIGNIFICANT CHANGE UP (ref 32–37)
MCHC RBC-ENTMCNC: 33.6 PG — HIGH (ref 27–31)
MCV RBC AUTO: 101.5 FL — HIGH (ref 81–99)
MONOCYTES # BLD AUTO: 1.31 K/UL — HIGH (ref 0.1–0.6)
MONOCYTES NFR BLD AUTO: 7.4 % — SIGNIFICANT CHANGE UP (ref 1.7–9.3)
NEUTROPHILS # BLD AUTO: 14.14 K/UL — HIGH (ref 1.4–6.5)
NEUTROPHILS NFR BLD AUTO: 80.2 % — HIGH (ref 42.2–75.2)
NRBC # BLD: 0 /100 WBCS — SIGNIFICANT CHANGE UP (ref 0–0)
PLATELET # BLD AUTO: 201 K/UL — SIGNIFICANT CHANGE UP (ref 130–400)
RBC # BLD: 2.68 M/UL — LOW (ref 4.2–5.4)
RBC # FLD: 13.3 % — SIGNIFICANT CHANGE UP (ref 11.5–14.5)
WBC # BLD: 17.64 K/UL — HIGH (ref 4.8–10.8)
WBC # FLD AUTO: 17.64 K/UL — HIGH (ref 4.8–10.8)

## 2019-12-15 RX ORDER — IBUPROFEN 200 MG
600 TABLET ORAL EVERY 6 HOURS
Refills: 0 | Status: DISCONTINUED | OUTPATIENT
Start: 2019-12-15 | End: 2019-12-17

## 2019-12-15 RX ADMIN — Medication 975 MILLIGRAM(S): at 14:17

## 2019-12-15 RX ADMIN — Medication 600 MILLIGRAM(S): at 11:51

## 2019-12-15 RX ADMIN — Medication 600 MILLIGRAM(S): at 05:25

## 2019-12-15 RX ADMIN — ENOXAPARIN SODIUM 40 MILLIGRAM(S): 100 INJECTION SUBCUTANEOUS at 16:58

## 2019-12-15 RX ADMIN — Medication 600 MILLIGRAM(S): at 17:01

## 2019-12-15 RX ADMIN — Medication 600 MILLIGRAM(S): at 23:54

## 2019-12-15 RX ADMIN — Medication 975 MILLIGRAM(S): at 20:52

## 2019-12-15 RX ADMIN — Medication 30 MILLIGRAM(S): at 00:56

## 2019-12-15 RX ADMIN — Medication 1 TABLET(S): at 11:51

## 2019-12-15 RX ADMIN — Medication 975 MILLIGRAM(S): at 08:57

## 2019-12-15 NOTE — PROGRESS NOTE ADULT - SUBJECTIVE AND OBJECTIVE BOX
CC: Postpartum    HPI: Pt doing well, pain well controlled. No overnight events, no acute complaints. Tolerating regular diet, ambulating, voiding. Passed flatus, passed BM. Breastfeeding.     ROS: Denies cardiovascular or respiratory symptoms    PAST MEDICAL & SURGICAL HISTORY:  No pertinent past medical history  History of skin graft  H/O dilation and curettage    PHYSICAL EXAM:   Vital Signs Last 24 Hrs  T(F): 97.6 (15 Dec 2019 08:02), Max: 99.7 (14 Dec 2019 15:54)  HR: 86 (15 Dec 2019 08:02) (80 - 95)  BP: 108/59 (15 Dec 2019 08:02) (100/54 - 135/66)  RR: 19 (15 Dec 2019 08:02) (18 - 19)  General - AAOx3, NAD  Heart - S1S2, RRR  Lungs - CTA BL  Abdomen:  - Soft, nontender, nondistended, BS+, JERRY dressing I/D/C  - Fundus firm, nontender, below the umbilicus  Pelvis/Vagina - Normal Lochia  Extremities - No calf tenderness, no swelling bilaterally     LABS:                        12.2   13.26 )-----------( 214      ( 12 Dec 2019 21:45 )             35.9     ASSESSMENT:   38yo now  POD#1 S/P PLTC/S for arrest of dilation, h/o antiphospholipid syndrome, recovering well     PLAN:  -Routine postpartum care  -Encourage ambulation  -f/u AM labs  -Anticipate d/c home     to be made aware. CC: Postpartum    HPI: Pt doing well, pain well controlled. No overnight events, no acute complaints. Tolerating regular diet, ambulating, voiding. Passed flatus, no BM yet. Breastfeeding.     ROS: Denies cardiovascular or respiratory symptoms    PAST MEDICAL & SURGICAL HISTORY:  No pertinent past medical history  History of skin graft  H/O dilation and curettage    PHYSICAL EXAM:   Vital Signs Last 24 Hrs  T(F): 97.6 (15 Dec 2019 08:02), Max: 99.7 (14 Dec 2019 15:54)  HR: 86 (15 Dec 2019 08:02) (80 - 95)  BP: 108/59 (15 Dec 2019 08:02) (100/54 - 135/66)  RR: 19 (15 Dec 2019 08:02) (18 - 19)  General - AAOx3, NAD  Heart - S1S2, RRR  Lungs - CTA BL  Abdomen:  - Soft, nontender, nondistended, BS+, JERRY dressing I/D/C  - Fundus firm, nontender, below the umbilicus  Pelvis/Vagina - Normal Lochia  Extremities - No calf tenderness, no swelling bilaterally     LABS:                        12.2   13.26 )-----------( 214      ( 12 Dec 2019 21:45 )             35.9     ASSESSMENT:   38 yo now , s/p primary  section for arrest of dilation, POD#1, h/o antiphospholipid syndrome, Lovenox for PP anticoagulation, recovering well,     PLAN:  -Routine postpartum care  -Encourage ambulation and incentive spirometry use  -f/u AM labs  -Lovenox as ordered  -Pain management prn  -Regular diet    Dr. Francois and Dr. Yarbrough to be made aware.

## 2019-12-16 RX ADMIN — Medication 975 MILLIGRAM(S): at 03:41

## 2019-12-16 RX ADMIN — Medication 1 TABLET(S): at 13:36

## 2019-12-16 RX ADMIN — Medication 975 MILLIGRAM(S): at 20:49

## 2019-12-16 RX ADMIN — Medication 600 MILLIGRAM(S): at 05:59

## 2019-12-16 RX ADMIN — Medication 600 MILLIGRAM(S): at 13:36

## 2019-12-16 RX ADMIN — Medication 975 MILLIGRAM(S): at 08:25

## 2019-12-16 RX ADMIN — ENOXAPARIN SODIUM 40 MILLIGRAM(S): 100 INJECTION SUBCUTANEOUS at 15:55

## 2019-12-16 RX ADMIN — Medication 975 MILLIGRAM(S): at 15:24

## 2019-12-16 RX ADMIN — Medication 600 MILLIGRAM(S): at 18:48

## 2019-12-16 NOTE — PROGRESS NOTE ADULT - SUBJECTIVE AND OBJECTIVE BOX
PGY 4 NOTE:    PGY 3 Note:    Pt doing well, pain well controlled. No acute complaints. Denies fever, chills, N/V, CP, SOB, severe abdominal pain or heavy vaginal bleeding, dysuria.    Ambulating: Yes  Voiding  Flatus: Yes  Bowel movements: Yes  Breast or bottle feeding: Both  Diet: Regular    PAST MEDICAL & SURGICAL HISTORY:  No pertinent past medical history  History of skin graft  H/O dilation and curettage      Physical Exam  Vital Signs Last 24 Hrs  T(F): 97.3 (16 Dec 2019 03:10), Max: 98.1 (15 Dec 2019 20:13)  HR: 92 (16 Dec 2019 03:10) (73 - 92)  BP: 124/66 (16 Dec 2019 03:10) (108/58 - 124/66)  RR: 18 (16 Dec 2019 03:10) (18 - 19)      Gen: AAOx3, NAD  Heart: S1S2, RRR  Lungs: CTABL  Abd: Soft, appropriately tender, mildly distended, BS+  Incision: JERRY dressing in place, no saturation.  Fundus: Firm, appropriately tender, below the umbilicus  Lochia: Normal  Ext: No calf tenderness, no swelling    Labs:                        9.0    17.64 )-----------( 201      ( 15 Dec 2019 09:34 )             27.2                         12.2   13.26 )-----------( 214      ( 12 Dec 2019 21:45 )             35.9 PGY 4 NOTE:    Pt doing well, pain well controlled. No acute complaints. Denies fever, chills, N/V, CP, SOB, severe abdominal pain or heavy vaginal bleeding, dysuria.    Ambulating: Yes  Voiding  Flatus: Yes  Bowel movements: No  Breast or bottle feeding: Both  Diet: Regular    PAST MEDICAL & SURGICAL HISTORY:  No pertinent past medical history  History of skin graft  H/O dilation and curettage      Physical Exam  Vital Signs Last 24 Hrs  T(F): 97.3 (16 Dec 2019 03:10), Max: 98.1 (15 Dec 2019 20:13)  HR: 92 (16 Dec 2019 03:10) (73 - 92)  BP: 124/66 (16 Dec 2019 03:10) (108/58 - 124/66)  RR: 18 (16 Dec 2019 03:10) (18 - 19)      Gen: AAOx3, NAD  Heart: S1S2, RRR  Lungs: CTABL  Abd: Soft, appropriately tender, mildly distended, BS+  Incision: JERRY dressing in place, no saturation.  Fundus: Firm, appropriately tender, below the umbilicus  Lochia: Normal  Ext: No calf tenderness, no swelling    Labs:                        9.0    17.64 )-----------( 201      ( 15 Dec 2019 09:34 )             27.2                         12.2   13.26 )-----------( 214      ( 12 Dec 2019 21:45 )             35.9

## 2019-12-16 NOTE — PROGRESS NOTE ADULT - ASSESSMENT
38 yo now P1, s/p primary low transverse  section for arrest of dilation, POD#2, h/o antiphospholipid syndrome, recovering well    -Routine postpartum care  -Encourage ambulation and incentive spirometry use  -Lovenox as ordered, to continue for 6 wks PP  -Pain management prn  -Regular diet    Dr. Yarbrough to be made aware

## 2019-12-17 ENCOUNTER — TRANSCRIPTION ENCOUNTER (OUTPATIENT)
Age: 39
End: 2019-12-17

## 2019-12-17 VITALS
RESPIRATION RATE: 18 BRPM | DIASTOLIC BLOOD PRESSURE: 74 MMHG | SYSTOLIC BLOOD PRESSURE: 119 MMHG | TEMPERATURE: 98 F | HEART RATE: 69 BPM

## 2019-12-17 RX ORDER — CETIRIZINE HYDROCHLORIDE 10 MG/1
1 TABLET ORAL
Qty: 0 | Refills: 0 | DISCHARGE

## 2019-12-17 RX ORDER — IBUPROFEN 200 MG
1 TABLET ORAL
Qty: 120 | Refills: 0
Start: 2019-12-17 | End: 2020-01-15

## 2019-12-17 RX ORDER — SIMETHICONE 80 MG/1
1 TABLET, CHEWABLE ORAL
Qty: 120 | Refills: 0
Start: 2019-12-17 | End: 2020-01-15

## 2019-12-17 RX ORDER — ENOXAPARIN SODIUM 100 MG/ML
40 INJECTION SUBCUTANEOUS
Qty: 1680 | Refills: 0
Start: 2019-12-17 | End: 2020-01-27

## 2019-12-17 RX ORDER — DOCUSATE SODIUM 100 MG
1 CAPSULE ORAL
Qty: 60 | Refills: 1
Start: 2019-12-17 | End: 2020-02-14

## 2019-12-17 RX ORDER — ACETAMINOPHEN 500 MG
2 TABLET ORAL
Qty: 240 | Refills: 0
Start: 2019-12-17 | End: 2020-01-15

## 2019-12-17 RX ADMIN — Medication 600 MILLIGRAM(S): at 12:13

## 2019-12-17 RX ADMIN — Medication 975 MILLIGRAM(S): at 03:56

## 2019-12-17 RX ADMIN — Medication 600 MILLIGRAM(S): at 06:18

## 2019-12-17 RX ADMIN — Medication 600 MILLIGRAM(S): at 00:40

## 2019-12-17 RX ADMIN — Medication 975 MILLIGRAM(S): at 08:45

## 2019-12-17 RX ADMIN — Medication 1 TABLET(S): at 12:13

## 2019-12-17 NOTE — DISCHARGE NOTE OB - MEDICATION SUMMARY - MEDICATIONS TO TAKE
I will START or STAY ON the medications listed below when I get home from the hospital:    ibuprofen 600 mg oral tablet  -- 1 tab(s) by mouth every 6 hours  -- Indication: For moderate pain as needed    acetaminophen 325 mg oral tablet  -- 2 tab(s) by mouth every 6 hours   -- Indication: For mild pain as needed    Lovenox 40 mg/0.4 mL injectable solution  -- 40 milligram(s) subcutaneously once a day   -- It is very important that you take or use this exactly as directed.  Do not skip doses or discontinue unless directed by your doctor.    -- Indication: For antiphospholipid syndrome    Prenatal Multivitamins with Folic Acid 1 mg oral tablet  -- 1 tab(s) by mouth once a day  -- Indication: For postpartum    Colace 100 mg oral capsule  -- 1 cap(s) by mouth 2 times a day   -- Medication should be taken with plenty of water.    -- Indication: For constipation    simethicone 80 mg oral tablet, chewable  -- 1 tab(s) by mouth every 6 hours, As Needed -Gas   -- Indication: For gas pain as needed

## 2019-12-17 NOTE — DISCHARGE NOTE OB - PATIENT PORTAL LINK FT
You can access the FollowMyHealth Patient Portal offered by [facility] by registering at the following website: [email or web address]. By joining [employer]’s FollowMyHealth portal, you will also be able to view your health information using other applications (apps) compatible with our system.

## 2019-12-17 NOTE — DISCHARGE NOTE OB - CARE PROVIDER_API CALL
Sergio Yarbrough)  Obstetrics and Gynecology  [address] [place] [address]  Phone: [phone]  Fax: [phone]  Established Patient  Follow Up Time: 1 week

## 2019-12-17 NOTE — DISCHARGE NOTE OB - CARE PLAN
Principal Discharge DX:	H/O  section  Goal:	Healthy recovery  Assessment and plan of treatment:	No heavy lifting.   Nothing inside the vagina for 6 weeks: no tampons, douching, sexual intercourse, tub baths or pools.   If you have a fever over 100.4F, severe abdominal pain or heavy vaginal bleeding please call your doctor or go to the emergency room.  Please follow up with your OBGYN in 1 week for postpartum for incision check, 6 weeks for a postpartum visit.

## 2019-12-17 NOTE — PROGRESS NOTE ADULT - ATTENDING COMMENTS
IMP: s/p LTCS - POD # 1 - stable    Plan: dc [facility], OOB/Ambulation, Lovenox, Pain management, Regular Diet
IMP: s/p LTCS - POD # 2 - Doing Well    Plan:  Continued PO Care, Ambulation, Regular Diet, Pain management, Lovenox
IMP: s/p LTCS - POD # 3 - Doing Well    Plan: dc home, NSAID's/PNV's, f/u office Saturday 12/21 - dressing removal

## 2019-12-17 NOTE — PROGRESS NOTE ADULT - SUBJECTIVE AND OBJECTIVE BOX
PGY 4 Note:    Pt doing well, pain well controlled. No acute complaints. Denies fever, chills, N/V, CP, SOB, severe abdominal pain or heavy vaginal bleeding, dysuria.    Ambulating: Yes  Voiding  Flatus: Yes  Bowel movements: No  Breast or bottle feeding: Both  Diet: Regular    PAST MEDICAL & SURGICAL HISTORY:  No pertinent past medical history  History of skin graft  H/O dilation and curettage      Physical Exam  Vital Signs Last 24 Hrs  T(F): 98.4 (16 Dec 2019 23:20), Max: 98.8 (16 Dec 2019 15:23)  HR: 82 (16 Dec 2019 23:20) (80 - 84)  BP: 108/55 (16 Dec 2019 23:20) (108/55 - 122/58)  RR: 20 (16 Dec 2019 23:20) (18 - 20)    Gen: AAOx3, NAD  Heart: S1S2, RRR  Lungs: CTABL  Abd: Soft, appropriately tender, mildly distended, BS+  Incision: Dressing in place, no saturation. Steri strips in place, incision c/d/i, no erythema/induration/drainage. JERRY changed.  Fundus: Firm, appropriately tender, below the umbilicus  Lochia: Normal  Ext: No calf tenderness, no swelling.    Labs:                        9.0    17.64 )-----------( 201      ( 15 Dec 2019 09:34 )             27.2                         12.2   13.26 )-----------( 214      ( 12 Dec 2019 21:45 )             35.9

## 2019-12-17 NOTE — PROGRESS NOTE ADULT - ASSESSMENT
38 yo now P1, s/p primary low transverse  section for arrest of dilation, POD#3, h/o antiphospholipid syndrome, recovering well    - DC home today  - DC instructions given  - Lovenox x 6 wks postpartum  - F/u in 1 wk for incision check    Dr. Yarbrough

## 2019-12-17 NOTE — DISCHARGE NOTE OB - PLAN OF CARE
Healthy recovery No heavy lifting.   Nothing inside the vagina for 6 weeks: no tampons, douching, sexual intercourse, tub baths or pools.   If you have a fever over 100.4F, severe abdominal pain or heavy vaginal bleeding please call your doctor or go to the emergency room.  Please follow up with your OBGYN in 1 week for postpartum for incision check, 6 weeks for a postpartum visit.

## 2019-12-20 DIAGNOSIS — Z3A.39 39 WEEKS GESTATION OF PREGNANCY: ICD-10-CM

## 2019-12-20 DIAGNOSIS — E72.12 METHYLENETETRAHYDROFOLATE REDUCTASE DEFICIENCY: ICD-10-CM

## 2019-12-20 DIAGNOSIS — D68.61 ANTIPHOSPHOLIPID SYNDROME: ICD-10-CM

## 2019-12-20 DIAGNOSIS — Z34.83 ENCOUNTER FOR SUPERVISION OF OTHER NORMAL PREGNANCY, THIRD TRIMESTER: ICD-10-CM

## 2019-12-20 DIAGNOSIS — Z79.01 LONG TERM (CURRENT) USE OF ANTICOAGULANTS: ICD-10-CM

## 2020-06-27 ENCOUNTER — TRANSCRIPTION ENCOUNTER (OUTPATIENT)
Age: 40
End: 2020-06-27

## 2020-11-05 ENCOUNTER — TRANSCRIPTION ENCOUNTER (OUTPATIENT)
Age: 40
End: 2020-11-05

## 2021-03-24 ENCOUNTER — OUTPATIENT (OUTPATIENT)
Dept: OUTPATIENT SERVICES | Facility: HOSPITAL | Age: 41
LOS: 1 days | Discharge: HOME | End: 2021-03-24
Payer: COMMERCIAL

## 2021-03-24 DIAGNOSIS — Z98.890 OTHER SPECIFIED POSTPROCEDURAL STATES: Chronic | ICD-10-CM

## 2021-03-24 DIAGNOSIS — Z12.31 ENCOUNTER FOR SCREENING MAMMOGRAM FOR MALIGNANT NEOPLASM OF BREAST: ICD-10-CM

## 2021-03-24 PROBLEM — Z78.9 OTHER SPECIFIED HEALTH STATUS: Chronic | Status: ACTIVE | Noted: 2019-12-12

## 2021-03-24 PROCEDURE — 77067 SCR MAMMO BI INCL CAD: CPT | Mod: 26

## 2021-03-24 PROCEDURE — 77063 BREAST TOMOSYNTHESIS BI: CPT | Mod: 26

## 2021-10-26 NOTE — PROCEDURE NOTE - NSHXREVIEWED_OBGYN_ALL_OB
Patient is alert and oriented x4, resting in bed. IV is patent, dry and intact. Patient complains of headache but states that rest/sleep helps with pain relief. No respiratory distress noted upon assessment. Patient is continued on room air with saturation of 96%. Patient is continued on contact and droplet isolation. Fall precautions are in place and bed is in lowest position. Call light is within reach. Will continue to monitor.    Yes

## 2022-12-25 NOTE — BRIEF OPERATIVE NOTE - PRIMARY SURGEON
Dr. Yarbrough Airway patent, TM normal bilaterally, normal appearing mouth, nose, throat, neck supple with full range of motion, no cervical adenopathy.

## 2023-08-11 NOTE — OB RN INTRAOPERATIVE NOTE - NS_INDWELLINGURINARYCATHETERINSERTEDBY_OBGYN_ALL_OB_FT
RN Mustarde Flap Text: The defect edges were debeveled with a #15 scalpel blade.  Given the size, depth and location of the defect and the proximity to free margins a Mustarde flap was deemed most appropriate.  Using a sterile surgical marker, an appropriate flap was drawn incorporating the defect. The area thus outlined was incised with a #15 scalpel blade.  The skin margins were undermined to an appropriate distance in all directions utilizing iris scissors.

## 2023-08-19 NOTE — OB PROVIDER H&P - CLICK TO LAUNCH ORM
Subjective  Stable no new complaints or incidents reported      Last Recorded Vitals  Vitals:    08/19/23 0014   BP: (!) 154/78   Pulse: 97   Resp:    Temp: 99.5 °F (37.5 °C)     Body mass index is 17.6 kg/m².    Intake/Output Summary (Last 24 hours) at 8/19/2023 0722  Last data filed at 8/19/2023 0345  Gross per 24 hour   Intake 540.1 ml   Output 900 ml   Net -359.9 ml          Physical Exam  Constitutional:  The patient is awake,  and cooperative.   ENT:  Normocephalic. Atraumatic. Bilateral external ears normal.  Eyes:  Pupils round and reactive to light.  Conjunctivae normal. Extraocular motion normal.  CVS  Normal heart rate. Normal rhythm. No murmurs. No rubs. No gallops.   Lungs:  Normal breath sounds. No respiratory distress. No wheezing. No chest tenderness.  Abd:  Soft, non-tender.  Normal bowel sounds.  MSK:  Normal range of motion.   Neuro:  A/O x 2.  Contracted Rt gaze.  Skin:  Warm. Dry. No erythema. No rash      Labs   No results displayed because visit has over 200 results.          Lab Results   Component Value Date    SODIUM 137 08/17/2023    POTASSIUM 3.5 08/17/2023    CHLORIDE 105 08/17/2023    CO2 28 08/17/2023    GLUCOSE 91 08/17/2023    BUN 23 (H) 08/17/2023    CREATININE 0.49 (L) 08/17/2023    CALCIUM 8.8 08/17/2023    ALBUMIN 2.5 (L) 08/15/2023    MG 2.3 08/15/2023    BILIRUBIN 0.4 08/15/2023    ALKPT 62 08/15/2023    LACTA 1.4 08/09/2023    AST 24 08/15/2023    GPT 34 08/15/2023       Imaging  MRI BRAIN W WO CONTRAST   Final Result          1. Stable severe communicating hydrocephalus compared to CT scan dated   08/09/2023. Etiology of the communicating hydrocephalus likely is   superficial siderosis secondary to previously seen large parenchymal and   intraventricular hemorrhage.   2. Chronic hemorrhage and encephalomalacia within the right posterior   temporal and occipital lobes without underlying mass lesion.      Electronically Signed by: INESSA VALDEZ M.D.    Signed on: 8/14/2023 12:57  PM    Workstation ID: [id number]      MRI SPINE SURVEY C T L LIMITED WO CONTRAST   Final Result       Normal MRI spine survey.      Electronically Signed by: INESSA VALDEZ M.D.    Signed on: 8/14/2023 1:13 PM    Workstation ID: [id number]      US KIDNEYS AND BLADDER COMPLETE URINARY SYSTEM   Final Result   No acute sonographic abnormality in the kidneys or bladder.      Electronically Signed by: AUGUSTA MELGAR M.D.    Signed on: 8/10/2023 10:29 AM    Workstation ID: [id number]      CT HEAD WO CONTRAST   Final Result       Stable appearance of encephalomalacia within the posterior medial right   parieto-occipital lobe.      Stable appearance of hydrocephalus involving lateral and 3rd ventricles.      Electronically Signed by: LAWRENCE BELL M.D.    Signed on: 8/9/2023 12:31 PM    Workstation ID: [id number]      XR CHEST PA OR AP 1 VIEW   Final Result   1. Worsening congestive changes.      Electronically Signed by: NELSON JACOBS M.D.    Signed on: 8/9/2023 11:24 AM    Workstation ID: [id number]            Current Meds  Current Facility-Administered Medications   Medication Dose Route Frequency Provider Last Rate Last Admin   • vancomycin (VANCOCIN) 750 mg in sodium chloride 0.9 % 250 mL IVPB  750 mg Intravenous Q12H Alicia Bell .7 mL/hr at 08/18/23 2035 750 mg at 08/18/23 2035   • sodium chloride (PF) 0.9 % injection 10 mL  10 mL Injection PRN Alicia Bell MD       • sodium chloride (PF) 0.9 % injection 10 mL  10 mL Injection 2 times per day Alicia Bell MD   10 mL at 08/18/23 2043   • sodium chloride (PF) 0.9 % injection 20 mL  20 mL Injection PRN Alicia Bell MD       • baclofen (LIORESAL) tablet 10 mg  10 mg Oral TID Rubinstein, Wayne A, MD   10 mg at 08/18/23 2046   • cefTRIAXone (ROCEPHIN) syringe 2,000 mg  2,000 mg Intravenous Daily Alicia Bell MD   2,000 mg at 08/18/23 0845   • VANCOMYCIN - PHARMACIST MONITORED Misc   Does not apply See  Admin Instructions Alicia Bell MD       • sodium chloride 0.9 % flush bag 25 mL  25 mL Intravenous PRN Deja Leiva DO       • sodium chloride (PF) 0.9 % injection 2 mL  2 mL Intracatheter 2 times per day Deja Leiva DO   2 mL at 08/18/23 2044   • acetaminophen (TYLENOL) tablet 650 mg  650 mg Oral Q6H PRN Cy Atkinson MD       • enoxaparin (LOVENOX) injection 40 mg  40 mg Subcutaneous Q24H Cy Atkinson MD   40 mg at 08/18/23 2046        Assessment and Plan  Principal Problem:    Acute cystitis with hematuria  Active Problems:    Impaired mobility and ADLs    Encephalopathy     # 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 ID consult S/P PICC line continue through 8/26/2023 discussed with  to get approval from his insurance for home IVABX and home health through 8/26/2023.  # Acute UTI with sepsis, US of the  normal. Urinating well no retention through [place] catheter.  # Lactic acidosis POA resolved  # Hypokalemia supplement  # Metabolic encephalopathy secondary to # 1  # Encephalomalacia and ventricul megaly (NPH) from previous cerebral bleed getting worse with recent MRI discussed with neuro surgery and neurology services  Agreed with the plan of placing a temporary EVD for future permanent  shunt after completion of iv antibiotics course through 8/26/2023  # Neuro cognitive disorders secondary to # 6  # Walking difficulty severe spasticity of the LE started on baclofen , evaluated by PT and [facility]. possible improvement following EVD    # Severe protein calorie malnutrition dietary consult nutritional supplement       DVT Prophylaxis  Current Active Medications for DVT Prophylaxis (From admission, onward)         Stop     enoxaparin (LOVENOX) injection 40 mg  40 mg,   Subcutaneous,   EVERY 24 HOURS         --                           Cy Atkinson MD   8/19/2023          .

## 2024-01-03 ENCOUNTER — APPOINTMENT (OUTPATIENT)
Dept: NEUROLOGY | Facility: CLINIC | Age: 44
End: 2024-01-03
Payer: COMMERCIAL

## 2024-01-03 DIAGNOSIS — Z78.9 OTHER SPECIFIED HEALTH STATUS: ICD-10-CM

## 2024-01-03 DIAGNOSIS — R90.82 WHITE MATTER DISEASE, UNSPECIFIED: ICD-10-CM

## 2024-01-03 DIAGNOSIS — F10.90 ALCOHOL USE, UNSPECIFIED, UNCOMPLICATED: ICD-10-CM

## 2024-01-03 DIAGNOSIS — Z86.59 PERSONAL HISTORY OF OTHER MENTAL AND BEHAVIORAL DISORDERS: ICD-10-CM

## 2024-01-03 DIAGNOSIS — Z86.39 PERSONAL HISTORY OF OTHER ENDOCRINE, NUTRITIONAL AND METABOLIC DISEASE: ICD-10-CM

## 2024-01-03 PROCEDURE — 99203 OFFICE O/P NEW LOW 30 MIN: CPT

## 2024-01-03 RX ORDER — METFORMIN HYDROCHLORIDE 1000 MG/1
1000 TABLET, COATED ORAL
Refills: 0 | Status: ACTIVE | COMMUNITY

## 2024-01-03 RX ORDER — LEVOTHYROXINE SODIUM 137 UG/1
TABLET ORAL
Refills: 0 | Status: ACTIVE | COMMUNITY

## 2024-01-03 RX ORDER — ASPIRIN 81 MG
81 TABLET, DELAYED RELEASE (ENTERIC COATED) ORAL
Refills: 0 | Status: ACTIVE | COMMUNITY

## 2024-01-03 RX ORDER — MELATONIN/PYRIDOXINE HCL (B6) 5 MG-10 MG
TABLET,IMMED, EXTENDED RELEASE, BIPHASIC ORAL
Refills: 0 | Status: ACTIVE | COMMUNITY

## 2024-01-03 RX ORDER — ESTRADIOL 0.5 MG/1
TABLET ORAL
Refills: 0 | Status: ACTIVE | COMMUNITY

## 2024-01-03 NOTE — HISTORY OF PRESENT ILLNESS
[FreeTextEntry1] : It's a pleasure to see Ms. SUPRIYA HARDY In the office today. She is a 43 year -  old woman  who presents to the office today for the evaluation of abnormal MRI of the brain report.  She reports that this was done because of her elevated prolactin.  Her pituitary was normal on the MRI but incidentally nonspecific white matter lesions were seen plus a questionable tiny right cerebellar chronic infarct which was only seen in 1 was included in the report.  Other than hyperlipidemia which is under control, patient has no risk factor for stroke and has no complaints.  She already had an embryo transfer in November 2023.

## 2024-01-03 NOTE — ASSESSMENT
[FreeTextEntry1] : White matter disease - Most likely secondary to microvascular ischemia, hyperlipidemia  Questionable right cerebellar chronic infarct - Considering its only seen in 1 image, it is more likely to be an artifact.  Plus, patient is young and does not have significant risk factors that would lead to a stroke.  I had a long discussion with the patient explaining why I think that is an artifact and not an actual stroke, but if concerned she can always have another MRI after birth.  Even if she did have a stroke, it is already chronic which would mean that it had happened more than 6-month ago and it would not be a contraindication for her upcoming procedure.  She is clear to go for the additional embryo transfer.  Total clinician time spent  is  32 minutes including preparing to see the patient, obtaining and/or reviewing and confirming history, performing a medically necessary and appropriate examination, counseling and educating the patient and/or family, documenting clinical information in the HER and communicating and/or referring to other healthcare professionals.   I, Indiana Kimbrough, Attest that this documentation has been prepared under the direction and in the presence of Provider Donny Robbins DO  Thank You for letting me assist in the management of this patient.   Donny Robbins DO Board Certified, Neurology
